# Patient Record
Sex: MALE | Race: WHITE | Employment: FULL TIME | URBAN - METROPOLITAN AREA
[De-identification: names, ages, dates, MRNs, and addresses within clinical notes are randomized per-mention and may not be internally consistent; named-entity substitution may affect disease eponyms.]

---

## 2020-01-11 ENCOUNTER — HOSPITAL ENCOUNTER (EMERGENCY)
Facility: CLINIC | Age: 35
Discharge: HOME | End: 2020-01-12
Attending: EMERGENCY MEDICINE | Admitting: EMERGENCY MEDICINE
Payer: COMMERCIAL

## 2020-01-11 ENCOUNTER — APPOINTMENT (OUTPATIENT)
Dept: GENERAL RADIOLOGY | Facility: CLINIC | Age: 35
End: 2020-01-11
Attending: EMERGENCY MEDICINE
Payer: COMMERCIAL

## 2020-01-11 DIAGNOSIS — J18.9 PNEUMONIA OF RIGHT LOWER LOBE DUE TO INFECTIOUS ORGANISM: ICD-10-CM

## 2020-01-11 LAB — INTERPRETATION ECG - MUSE: NORMAL

## 2020-01-11 PROCEDURE — 99285 EMERGENCY DEPT VISIT HI MDM: CPT | Mod: 25

## 2020-01-11 PROCEDURE — 93005 ELECTROCARDIOGRAM TRACING: CPT

## 2020-01-11 PROCEDURE — 71046 X-RAY EXAM CHEST 2 VIEWS: CPT

## 2020-01-11 PROCEDURE — 94640 AIRWAY INHALATION TREATMENT: CPT

## 2020-01-11 PROCEDURE — 87804 INFLUENZA ASSAY W/OPTIC: CPT | Performed by: EMERGENCY MEDICINE

## 2020-01-11 RX ORDER — IPRATROPIUM BROMIDE AND ALBUTEROL SULFATE 2.5; .5 MG/3ML; MG/3ML
3 SOLUTION RESPIRATORY (INHALATION) ONCE
Status: COMPLETED | OUTPATIENT
Start: 2020-01-11 | End: 2020-01-12

## 2020-01-11 ASSESSMENT — ENCOUNTER SYMPTOMS
HEADACHES: 1
SORE THROAT: 0
NAUSEA: 0
FEVER: 1
CHILLS: 1
COUGH: 1
RHINORRHEA: 0
VOMITING: 1

## 2020-01-11 ASSESSMENT — MIFFLIN-ST. JEOR: SCORE: 1663.4

## 2020-01-11 NOTE — ED AVS SNAPSHOT
Emergency Department  [address] [facility] [address]  Phone:  [phone]  Fax:  [phone]                                    Yousuf Alvarez   MRN: [id number]    Department:   Emergency Department   Date of Visit:  1/11/2020           After Visit Summary Signature Page    I have received my discharge instructions, and my questions have been answered. I have discussed any challenges I see with this plan with the nurse or doctor.    ..........................................................................................................................................  Patient/Patient Representative Signature      ..........................................................................................................................................  Patient Representative Print Name and Relationship to Patient    ..................................................               ................................................  Date                                   Time    ..........................................................................................................................................  Reviewed by Signature/Title    ...................................................              ..............................................  Date                                               Time          22EPIC Rev 08/18

## 2020-01-12 VITALS
WEIGHT: 150 LBS | BODY MASS INDEX: 20.32 KG/M2 | RESPIRATION RATE: 18 BRPM | OXYGEN SATURATION: 98 % | HEIGHT: 72 IN | HEART RATE: 71 BPM | DIASTOLIC BLOOD PRESSURE: 80 MMHG | SYSTOLIC BLOOD PRESSURE: 132 MMHG | TEMPERATURE: 98.5 F

## 2020-01-12 LAB
FLUAV+FLUBV AG SPEC QL: NEGATIVE
FLUAV+FLUBV AG SPEC QL: NEGATIVE
SPECIMEN SOURCE: NORMAL

## 2020-01-12 PROCEDURE — 25000125 ZZHC RX 250: Performed by: EMERGENCY MEDICINE

## 2020-01-12 PROCEDURE — 25000132 ZZH RX MED GY IP 250 OP 250 PS 637: Performed by: EMERGENCY MEDICINE

## 2020-01-12 RX ORDER — BENZONATATE 200 MG/1
200 CAPSULE ORAL 3 TIMES DAILY PRN
Qty: 15 CAPSULE | Refills: 0 | Status: SHIPPED | OUTPATIENT
Start: 2020-01-12 | End: 2020-02-10

## 2020-01-12 RX ORDER — DOXYCYCLINE 100 MG/1
100 CAPSULE ORAL 2 TIMES DAILY
Qty: 20 CAPSULE | Refills: 0 | Status: SHIPPED | OUTPATIENT
Start: 2020-01-12 | End: 2020-02-10

## 2020-01-12 RX ORDER — DOXYCYCLINE 100 MG/1
100 CAPSULE ORAL ONCE
Status: COMPLETED | OUTPATIENT
Start: 2020-01-12 | End: 2020-01-12

## 2020-01-12 RX ORDER — BENZONATATE 100 MG/1
100 CAPSULE ORAL ONCE
Status: COMPLETED | OUTPATIENT
Start: 2020-01-12 | End: 2020-01-12

## 2020-01-12 RX ORDER — IBUPROFEN 600 MG/1
600 TABLET, FILM COATED ORAL EVERY 6 HOURS PRN
Qty: 30 TABLET | Refills: 0 | Status: SHIPPED | OUTPATIENT
Start: 2020-01-12 | End: 2020-02-10

## 2020-01-12 RX ORDER — ALBUTEROL SULFATE 90 UG/1
2 AEROSOL, METERED RESPIRATORY (INHALATION) EVERY 4 HOURS PRN
Qty: 1 INHALER | Refills: 0 | Status: SHIPPED | OUTPATIENT
Start: 2020-01-12 | End: 2020-02-11

## 2020-01-12 RX ADMIN — BENZONATATE 100 MG: 100 CAPSULE ORAL at 00:34

## 2020-01-12 RX ADMIN — IPRATROPIUM BROMIDE AND ALBUTEROL SULFATE 3 ML: .5; 3 SOLUTION RESPIRATORY (INHALATION) at 00:02

## 2020-01-12 RX ADMIN — DOXYCYCLINE 100 MG: 100 CAPSULE ORAL at 00:34

## 2020-01-12 NOTE — ED NOTES
Pt states his breathing has improved, increased coughing but he states the feeling in his chest has been completely relieved.

## 2020-01-12 NOTE — ED TRIAGE NOTES
Pt has had cough and fever since Monday.  Reports about 1 hour ago having a dull aching chest pain.

## 2020-01-12 NOTE — ED PROVIDER NOTES
History     Chief Complaint:    Flu Symptoms and Chest Pain      HPI   Yousuf Alvarez is a 34 year old male who presents with flu symptoms and chest pain. Patient states that he has had a cough for the last three weeks. He has also kalyan experiencing fevers, body aches, and chills for the past 6 days. He notes that today he began feeling really dull chest pain and a headache which came on when he lays down. He states that the pain is a 3 / 10. He also feels like his breaths are not as deep as they normally are, but his cough is less productive today. Additionally, twice in the last 48 hours, he has coughed so hard that he has vomited. Furthermore, changing positions worsens his body aches but not his chest pain. He has been taking ibuprofen for his symptoms, which has offered mild relief. His last ibuprofen was an hour ago. He states that is rare for him to get headaches or chest pains. He notes he did not take the flu shot this season. He denies experiencing pain when he takes a deep breath, sore throat, nausea or runny nose. He denies history of chronic health conditions, smoking, or chronic medication use. He denies a family history of heart trouble.    Allergies:  The patient has no known drug allergies.    Medications:    The patient is currently on no regular medications.    Past Medical History:    The patient denies any significant past medical history.    Past Surgical History:    The patient does not have any pertinent past surgical history.    Family History:    No past pertinent family history.    Social History:  The patient denies tobacco or alcohol use.    Marital Status:         Review of Systems   Constitutional: Positive for chills and fever.   HENT: Negative for rhinorrhea and sore throat.    Respiratory: Positive for cough.    Cardiovascular: Positive for chest pain.   Gastrointestinal: Positive for vomiting. Negative for nausea.   Neurological: Positive for headaches.   All other systems reviewed  and are negative.        Physical Exam   First Vitals:  BP: (!) 147/86  Pulse: 93  Heart Rate: 93  Temp: 98.5  F (36.9  C)  Resp: 18  Height: 182.9 cm (6')  Weight: 68 kg (150 lb)  SpO2: 99 %      Physical Exam  Constitutional:  Appears well-developed and well-nourished. Cooperative.   HENT:   Head:    Atraumatic.   Mouth/Throat:   Oropharynx is without erythema or exudate and mucous     membranes are moist.   Eyes:    Conjunctivae normal and EOM are normal.      Pupils are equal, round, and reactive to light.   Neck:    Normal range of motion. Neck supple.   Cardiovascular:  Normal rate, regular rhythm, normal heart sounds and radial and    dorsalis pedis pulses are 2+ and symmetric.    Pulmonary/Chest:  Diminished breath sounds both lung bases. Few crackles at the right base and left base with expiratory wheeze and occasional cough    Abdominal:   Soft. Bowel sounds are normal.      No splenomegaly or hepatomegaly. No tenderness. No rebound.   Musculoskeletal:  Normal range of motion. No edema and no tenderness.   Neurological:  Alert. Normal strength. No cranial nerve deficit.   Skin:    Skin is warm and dry.   Psychiatric:   Normal mood and affect.       Emergency Department Course   ECG:  Indication: Chest Pain  Time: 2329  Vent. Rate 81 bpm. NV interval 118. QRS duration 84. QT/QTc 358/415. P-R-T axis 48 72 68.  Normal sinus rhythm Normal ECG. Read time: 2334    Imaging:  XR Chest PA and LAT:   IMPRESSION: Right lower lobe airspace opacity, may reflect pneumonia in the proper clinical context, recommend follow-up to resolution. Hyperaeration. No pneumothorax or pleural effusion. Cardiac silhouette within normal limits. No acute osseous   abnormality. as per radiology.    Laboratory:  Influenza A/B Nasopharyngeal: Negative    Interventions:  0002 Duoneb Nebulization   0024 Vibramycin 100 mg Oral    Emergency Department Course:  Nursing notes and vitals reviewed. (8484) I performed an exam of the patient as  documented above.     Medicine administered as documented above. Swab collected. This was sent to the lab for further testing, results above.     The patient was sent for a XR Chest PA and LAT while in the emergency department, findings above.     0026 I rechecked the patient and discussed the results of his workup thus far.     Findings and plan explained to the Patient. Patient discharged home with instructions regarding supportive care, medications, and reasons to return. The importance of close follow-up was reviewed. The patient was prescribed albuterol, vibramycin, and ibuprofen.    I personally reviewed the laboratory results with the Patient and answered all related questions prior to discharge.     Impression & Plan      Medical Decision Making:  Yousuf Alvarez is a 34 year old male who presents for evaluation of flu symptoms and chest pain.  History, physical exam and imaging studies are consistent with pneumonia.  First dose of antibiotics given in ED within 2 hours of diagnosis.  There are no signs of complications of pneumonia at this point such as septic shock, bacteremia, empyema, hypoxia, respiratory failure or compromise.  Supportive outpatient management is therefore indicated.  I will not therefore hospitalize for further cares or IV antibiotics.  This seems to be community acquired pneumonia and there are no risk factors at this point for coverage of antibiotic-resistant strains.  I doubt PE, dissection, acute coronary syndrome, or other worrisome etiology for patients symptoms. Patient will follow-up with primary care physician regardless of disease course within 2 days maximum.  Signs for return visit to ED were discussed with patient and pneumonia precautions given for home. Radiologist recommended follow up chest xray to ensure that his xray is cleared. I discussed this with the patient and he voiced understanding.   Diagnosis:    ICD-10-CM    1. Pneumonia of right lower lobe due to infectious  organism (H) J18.1      Disposition:  discharged to home with prescription for albuterol, vibramycin, and ibuprofen.     Discharge Medications:  New Prescriptions    ALBUTEROL (PROAIR HFA/PROVENTIL HFA/VENTOLIN HFA) 108 (90 BASE) MCG/ACT INHALER    Inhale 2 puffs into the lungs every 4 hours as needed for shortness of breath / dyspnea or wheezing    BENZONATATE (TESSALON) 200 MG CAPSULE    Take 1 capsule (200 mg) by mouth 3 times daily as needed for cough    DOXYCYCLINE HYCLATE (VIBRAMYCIN) 100 MG CAPSULE    Take 1 capsule (100 mg) by mouth 2 times daily for 10 days    IBUPROFEN (ADVIL/MOTRIN) 600 MG TABLET    Take 1 tablet (600 mg) by mouth every 6 hours as needed for moderate pain     Scribe Disclosure:  Lanre OWEN, am serving as a scribe on 1/11/2020 at 11:41 PM to personally document services performed by Raghu Galindo MD based on my observations and the provider's statements to me.     Lanre Torres  1/11/2020    EMERGENCY DEPARTMENT       Raghu Galindo MD  01/13/20 0357

## 2020-02-10 ENCOUNTER — MYC MEDICAL ADVICE (OUTPATIENT)
Dept: FAMILY MEDICINE | Facility: CLINIC | Age: 35
End: 2020-02-10

## 2020-02-10 ENCOUNTER — OFFICE VISIT (OUTPATIENT)
Dept: FAMILY MEDICINE | Facility: CLINIC | Age: 35
End: 2020-02-10
Payer: COMMERCIAL

## 2020-02-10 ENCOUNTER — ANCILLARY PROCEDURE (OUTPATIENT)
Dept: GENERAL RADIOLOGY | Facility: CLINIC | Age: 35
End: 2020-02-10
Attending: FAMILY MEDICINE
Payer: COMMERCIAL

## 2020-02-10 VITALS
HEIGHT: 72 IN | DIASTOLIC BLOOD PRESSURE: 80 MMHG | OXYGEN SATURATION: 100 % | RESPIRATION RATE: 16 BRPM | BODY MASS INDEX: 19.77 KG/M2 | SYSTOLIC BLOOD PRESSURE: 147 MMHG | WEIGHT: 146 LBS | TEMPERATURE: 98.3 F | HEART RATE: 77 BPM

## 2020-02-10 DIAGNOSIS — J18.9 PNEUMONIA OF RIGHT LOWER LOBE DUE TO INFECTIOUS ORGANISM: ICD-10-CM

## 2020-02-10 DIAGNOSIS — J18.9 PNEUMONIA OF RIGHT LOWER LOBE DUE TO INFECTIOUS ORGANISM: Primary | ICD-10-CM

## 2020-02-10 DIAGNOSIS — J98.9 REACTIVE AIRWAY DISEASE THAT IS NOT ASTHMA: ICD-10-CM

## 2020-02-10 DIAGNOSIS — J98.9 REACTIVE AIRWAY DISEASE THAT IS NOT ASTHMA: Primary | ICD-10-CM

## 2020-02-10 PROCEDURE — 99204 OFFICE O/P NEW MOD 45 MIN: CPT | Performed by: FAMILY MEDICINE

## 2020-02-10 PROCEDURE — 71046 X-RAY EXAM CHEST 2 VIEWS: CPT | Mod: FY

## 2020-02-10 ASSESSMENT — MIFFLIN-ST. JEOR: SCORE: 1645.25

## 2020-02-10 NOTE — NURSING NOTE
Chief Complaint   Patient presents with     ER F/U     BP (!) 147/80   Pulse 77   Temp 98.3  F (36.8  C) (Oral)   Resp 16   Ht 1.829 m (6')   Wt 66.2 kg (146 lb)   SpO2 100%   BMI 19.80 kg/m   Estimated body mass index is 19.8 kg/m  as calculated from the following:    Height as of this encounter: 1.829 m (6').    Weight as of this encounter: 66.2 kg (146 lb).  bp completed using cuff size: regular       Health Maintenance addressed:  BP was high, used pink card, recheck manually    Possibly completing today per provider review.    April Richards, [facility], [place]

## 2020-02-10 NOTE — PROGRESS NOTES
Subjective     Yousuf Alvarez is a 34 year old male who presents to clinic today for the following health issues:    HPI   ED/UC Followup:    Facility:  [facility]   Date of visit: 01/11/2020  Reason for visit: flu like symptoms   Current Status: diagnosed with pneumonia was given antibiotics but still has lingering cough      Felt great within a handful of days  Not using the inh anymore  But still a lingering cough    Has been a month    Able to work out  Hx of asthma when he was a kid    ROS: As per HPI.  Constitutional:as above  Eyes: No vision changes or eye irritation  Ears/Nose/Throat: No runny nose, sore throat or ear pain  CV: no palpitations, no chest pain, no lower extremity swelling.  Resp: as above.  GI: no nausea/vomiting/diarrhea, no black or bloody stools  : no burning or urgency with urination  Skin: no rash  Musculoskeletal: no joint pain, muscle pain, weakness  Psych: no depression, no concerns about anxiety  Neuro: no new headaches, dizziness, numbness, or tingling    I have reviewed and updated the patient's past medical history in the EMR. Current problems are:    There is no problem list on file for this patient.    Past Surgical History:   Procedure Laterality Date     SHOULDER SURGERY Right 2001    dislocation       Social History     Tobacco Use     Smoking status: Never Smoker     Smokeless tobacco: Never Used   Substance Use Topics     Alcohol use: Yes     Family History   Problem Relation Age of Onset     Family History Negative Mother      Skin Cancer Father          Allergies, reviewed:   No Known Allergies    Current Outpatient Medications   Medication Sig Dispense Refill     mometasone-formoterol (DULERA) 100-5 MCG/ACT inhaler Inhale 2 puffs into the lungs 2 times daily 13 g 1     albuterol (PROAIR HFA/PROVENTIL HFA/VENTOLIN HFA) 108 (90 Base) MCG/ACT inhaler Inhale 2 puffs into the lungs every 4 hours as needed for shortness of breath / dyspnea or wheezing (Patient not taking:  Reported on 2/10/2020) 1 Inhaler 0     fluticasone-vilanterol (BREO ELLIPTA) 100-25 MCG/INH inhaler Inhale 1 puff into the lungs daily 1 Inhaler 1       Objective:  BP (!) 147/80   Pulse 77   Temp 98.3  F (36.8  C) (Oral)   Resp 16   Ht 1.829 m (6')   Wt 66.2 kg (146 lb)   SpO2 100%   BMI 19.80 kg/m    General Appearance: Pleasant, alert, WN/WD in no acute respiratory distress.  Head Exam: Normal. Normocephalic, atraumatic. No sinus tenderness.  Eye Exam: Normal external eye, conjunctiva, lids. MARYBETH.  Chest/Respiratory Exam: Normal, comfortable, easy respirations. Chest wall normal. Lungs are clear to auscultation. No wheezing, crackles, or rhonchi.  Gastrointestinal Exam: Soft, non-tender, no masses or organomegaly.  Musculoskeletal Exam: Back is non-tender, full ROM of upper and lower extremities.  Skin: no rash, warm and dry.  Neurologic Exam: Nonfocal, no tremor. Normal gait.  Psychiatric Exam: Alert - appropriate, normal affect    XRAY:  Chest 2 views:  Personally reviewed by me - no airspace, soft tissue, cardiac or bony abnormalities.  Resolved based on xray [facility]    ASSESSMENT/PLAN:    ICD-10-CM    1. Pneumonia of right lower lobe due to infectious organism (H) J18.1 XR Chest 2 Views     mometasone-formoterol (DULERA) 100-5 MCG/ACT inhaler   2. Reactive airway disease that is not asthma R09.89 mometasone-formoterol (DULERA) 100-5 MCG/ACT inhaler     Post pnaumonia symptoms  Add inh medication    Follow up: Return as needed if symptoms fail to improve       Return in about 1 year (around 2/10/2021) for Physical, or as needed if today's problem persists.      Leonardo Devi MD, MPH

## 2020-02-11 NOTE — TELEPHONE ENCOUNTER
JF,    Please see [employer] message below  Insurance does not cover Dulera.    Please send alternative.    Thanks,  Julianne Strickland RN

## 2020-02-11 NOTE — TELEPHONE ENCOUNTER
breo elipta in    If this is not covered would appreciate pharmacist recommendation for inh steroid

## 2020-03-11 ENCOUNTER — HEALTH MAINTENANCE LETTER (OUTPATIENT)
Age: 35
End: 2020-03-11

## 2020-05-01 DIAGNOSIS — J98.9 REACTIVE AIRWAY DISEASE THAT IS NOT ASTHMA: ICD-10-CM

## 2020-05-01 NOTE — TELEPHONE ENCOUNTER
"Requested Prescriptions   Pending Prescriptions Disp Refills     BREO ELLIPTA 100-25 MCG/INH inhaler [Pharmacy Med Name: BREO ELLIPTA 100-25 MCG INH]  Last Written Prescription Date:  2/11/2020  Last Fill Quantity: 1 Inhaler,  # refills: 1   Last office visit: 2/10/2020 with prescribing provider:  Shandra   Future Office Visit:      1     Sig: TAKE 1 PUFF BY MOUTH EVERY DAY       Inhaled Steroids Protocol Passed - 5/1/2020 12:10 AM        Passed - Patient is age 12 or older        Passed - Recent (12 mo) or future (30 days) visit within the authorizing provider's specialty     Patient has had an office visit with the authorizing provider or a provider within the authorizing providers department within the previous 12 mos or has a future within next 30 days. See \"Patient Info\" tab in inbasket, or \"Choose Columns\" in Meds & Orders section of the refill encounter.              Passed - Medication is active on med list              "

## 2020-05-01 NOTE — TELEPHONE ENCOUNTER
Patient given for pneumonia 2/10/2020.    [employer] message sent to patient.  Julianne Strickland RN

## 2020-05-05 NOTE — TELEPHONE ENCOUNTER
CW,   Please advise in JF's absence  Left VM #2 for patient  See below messages  No response from patient to our outreach  Please advise on further refill  Thanks,  Tonia SIMON RN

## 2021-01-04 ENCOUNTER — HEALTH MAINTENANCE LETTER (OUTPATIENT)
Age: 36
End: 2021-01-04

## 2021-04-25 ENCOUNTER — HEALTH MAINTENANCE LETTER (OUTPATIENT)
Age: 36
End: 2021-04-25

## 2021-10-10 ENCOUNTER — HEALTH MAINTENANCE LETTER (OUTPATIENT)
Age: 36
End: 2021-10-10

## 2022-05-22 ENCOUNTER — HEALTH MAINTENANCE LETTER (OUTPATIENT)
Age: 37
End: 2022-05-22

## 2022-09-18 ENCOUNTER — HEALTH MAINTENANCE LETTER (OUTPATIENT)
Age: 37
End: 2022-09-18

## 2023-06-04 ENCOUNTER — HEALTH MAINTENANCE LETTER (OUTPATIENT)
Age: 38
End: 2023-06-04